# Patient Record
Sex: MALE | Race: WHITE
[De-identification: names, ages, dates, MRNs, and addresses within clinical notes are randomized per-mention and may not be internally consistent; named-entity substitution may affect disease eponyms.]

---

## 2018-03-28 ENCOUNTER — HOSPITAL ENCOUNTER (EMERGENCY)
Dept: HOSPITAL 80 - FED | Age: 23
Discharge: HOME | End: 2018-03-28
Payer: COMMERCIAL

## 2018-03-28 VITALS — DIASTOLIC BLOOD PRESSURE: 76 MMHG | SYSTOLIC BLOOD PRESSURE: 137 MMHG
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
## 2018-03-28 DIAGNOSIS — F07.81: ICD-10-CM

## 2018-03-28 DIAGNOSIS — S01.01XA: Primary | ICD-10-CM
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
## 2018-09-10 ENCOUNTER — HOSPITAL ENCOUNTER (EMERGENCY)
Dept: HOSPITAL 80 - FED | Age: 23
Discharge: HOME | End: 2018-09-10
Payer: COMMERCIAL

## 2018-09-10 VITALS — DIASTOLIC BLOOD PRESSURE: 80 MMHG | SYSTOLIC BLOOD PRESSURE: 125 MMHG

## 2018-09-10 DIAGNOSIS — R07.89: Primary | ICD-10-CM
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
## 2018-09-10 LAB — PLATELET # BLD: 235 10^3/UL (ref 150–400)

## 2018-09-10 NOTE — CPEKG
Test Reason : OPEN

Blood Pressure : ***/*** mmHG

Vent. Rate : 052 BPM     Atrial Rate : 000 BPM

   P-R Int : 167 ms          QRS Dur : 088 ms

    QT Int : 456 ms       P-R-T Axes : 029 052 039 degrees

   QTc Int : 424 ms

 

Sinus arrhythmia

 

Confirmed by Benson Mccatrhy ([id number]) on 9/10/2018 11:34:03 PM

 

Referred By:             Confirmed By:Benson Mccarthy
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
